# Patient Record
Sex: FEMALE
[De-identification: names, ages, dates, MRNs, and addresses within clinical notes are randomized per-mention and may not be internally consistent; named-entity substitution may affect disease eponyms.]

---

## 2021-07-02 PROBLEM — Z00.00 ENCOUNTER FOR PREVENTIVE HEALTH EXAMINATION: Status: ACTIVE | Noted: 2021-07-02

## 2021-10-01 ENCOUNTER — LABORATORY RESULT (OUTPATIENT)
Age: 51
End: 2021-10-01

## 2021-10-01 ENCOUNTER — APPOINTMENT (OUTPATIENT)
Dept: RHEUMATOLOGY | Facility: CLINIC | Age: 51
End: 2021-10-01
Payer: MEDICARE

## 2021-10-01 VITALS
HEART RATE: 72 BPM | TEMPERATURE: 97.2 F | DIASTOLIC BLOOD PRESSURE: 92 MMHG | WEIGHT: 207 LBS | OXYGEN SATURATION: 100 % | SYSTOLIC BLOOD PRESSURE: 170 MMHG | BODY MASS INDEX: 33.27 KG/M2 | HEIGHT: 66 IN

## 2021-10-01 PROCEDURE — 99204 OFFICE O/P NEW MOD 45 MIN: CPT | Mod: 25

## 2021-10-01 PROCEDURE — 36415 COLL VENOUS BLD VENIPUNCTURE: CPT

## 2021-10-01 NOTE — HISTORY OF PRESENT ILLNESS
[FreeTextEntry1] : 50 y/o female referred to rheumatology for head pressure, pain throughout her body.\par Pt reports pains in the b/l legs, back, fingers, recurrent pressure in different parts of the head x 6-7 months.\par Reports swelling of b/l ankles, tightness of b/l legs.Reports generalized muscle weakness, muscle aches.\par Has smoker's cough, fatigue occasionally, dry eyes uses eye drops.\par Had recent CXR reportedly showed COPD.\par Per patient, MRI of Head was negative.\par Workup with high CPK, statin not held due to Hx of CAD and stents.\par Miscarriages x 2, few months in (has 6 children)\par \par Patient denies fatigue, fever, chills, nasopharyngeal ulcers, chest pain, abdominal pain, SOB, nausea, vomiting, diarrhea, blood in stool, hematuria, rash, photosensitivity, Raynaud's, alopecia, dry mouth, headaches, dysphagia, Hx of DVT/PEs\par \par ROS negative unless otherwise noted above.\par \par Workup 5/2021: , Hgb A1C 8.5\par Normal/neg CBC, CMP, TSH, thyroid panel\par \par

## 2021-10-01 NOTE — ASSESSMENT
[FreeTextEntry1] : 52 y/o female w/ diabetes referred to rheumatology for elevated CPK in setting of generalized joint/muscle aches and head pressure x 6-7 months. Joint pains non-inflammatory by history. Fatigue and dry eyes but no other systemic symptoms. Per patient, MRI of Head was negative. Workup with high CPK 600s (1/2021 and 5/2021), statin not held due to Hx of CAD and stents.\par \par Causes of elevated CPK include autoimmune myositis, endocrine (diabetic myonecrosis, thyroid dysfunction), infectious, medication induced (e.g. statins).  patients can also have elevated baseline level of CPK, which is also possible in this patient given normal LFTs.\par Patient does not have objective muscle weakness on exam and no rashes pathognomonic for dermatomyositis. Low suspicion for inflammatory myopathy or vasculitis based on symptoms and exam.\par \par - Will obtain workup to evaluate for myositis other causes of myopathy. Will consider further testing such as SSA, SSb, Sm, RNP, ANCAs, Hep panel, cryo based on results from above studied.\par - No focal area of weakness or pain to obtain EMG/NCP or MRI or biopsy for evaluation.\par - Will defer to PCP on the risk vs. benefit of continuing on Statin in setting of high CPK and pt's cardiac condition. If workup is unremarkable, CPK is likely not related to muscle damage and Statin can be continued.\par - RTC in 1 month for follow up of above.\par

## 2021-10-02 LAB
25(OH)D3 SERPL-MCNC: 14.1 NG/ML
ALBUMIN SERPL ELPH-MCNC: 4.6 G/DL
ALP BLD-CCNC: 136 U/L
ALT SERPL-CCNC: 20 U/L
ANION GAP SERPL CALC-SCNC: 12 MMOL/L
APPEARANCE: CLEAR
AST SERPL-CCNC: 18 U/L
BASOPHILS # BLD AUTO: 0.05 K/UL
BASOPHILS NFR BLD AUTO: 0.7 %
BILIRUB SERPL-MCNC: 0.3 MG/DL
BILIRUBIN URINE: NEGATIVE
BLOOD URINE: NEGATIVE
BUN SERPL-MCNC: 10 MG/DL
CALCIUM SERPL-MCNC: 9.6 MG/DL
CHLORIDE SERPL-SCNC: 98 MMOL/L
CK SERPL-CCNC: 468 U/L
CO2 SERPL-SCNC: 28 MMOL/L
COLOR: YELLOW
CREAT SERPL-MCNC: 0.72 MG/DL
CREAT SPEC-SCNC: 77 MG/DL
CREAT/PROT UR: 0.6 RATIO
CRP SERPL-MCNC: <3 MG/L
EOSINOPHIL # BLD AUTO: 0.08 K/UL
EOSINOPHIL NFR BLD AUTO: 1.1 %
ERYTHROCYTE [SEDIMENTATION RATE] IN BLOOD BY WESTERGREN METHOD: 6 MM/HR
GLUCOSE QUALITATIVE U: ABNORMAL
GLUCOSE SERPL-MCNC: 352 MG/DL
HCT VFR BLD CALC: 39.9 %
HGB BLD-MCNC: 13 G/DL
IMM GRANULOCYTES NFR BLD AUTO: 0.4 %
KETONES URINE: NEGATIVE
LEUKOCYTE ESTERASE URINE: NEGATIVE
LYMPHOCYTES # BLD AUTO: 2.62 K/UL
LYMPHOCYTES NFR BLD AUTO: 37.1 %
MAN DIFF?: NORMAL
MCHC RBC-ENTMCNC: 31 PG
MCHC RBC-ENTMCNC: 32.6 GM/DL
MCV RBC AUTO: 95.2 FL
MONOCYTES # BLD AUTO: 0.34 K/UL
MONOCYTES NFR BLD AUTO: 4.8 %
NEUTROPHILS # BLD AUTO: 3.95 K/UL
NEUTROPHILS NFR BLD AUTO: 55.9 %
NITRITE URINE: NEGATIVE
PH URINE: 6
PLATELET # BLD AUTO: 219 K/UL
POTASSIUM SERPL-SCNC: 4.7 MMOL/L
PROT SERPL-MCNC: 7.2 G/DL
PROT UR-MCNC: 42 MG/DL
PROTEIN URINE: ABNORMAL
RBC # BLD: 4.19 M/UL
RBC # FLD: 14.6 %
SODIUM SERPL-SCNC: 138 MMOL/L
SPECIFIC GRAVITY URINE: 1.03
TSH SERPL-ACNC: 0.79 UIU/ML
UROBILINOGEN URINE: NORMAL
WBC # FLD AUTO: 7.07 K/UL

## 2021-10-03 LAB — ANA SER IF-ACNC: NEGATIVE

## 2021-10-04 LAB
ALDOLASE SERPL-CCNC: 5 U/L
ENA JO1 AB SER IA-ACNC: <0.2 AL

## 2021-10-05 LAB — ACE BLD-CCNC: 5 U/L

## 2021-10-10 LAB — HMGCR ANTIBODY, IGG: <3 UNITS

## 2021-10-19 LAB
EJ AB SER QL: NEGATIVE
ENA JO1 AB SER IA-ACNC: <20 UNITS
ENA PM/SCL AB SER-ACNC: <20 UNITS
ENA SM+RNP AB SER IA-ACNC: <20 UNITS
ENA SS-A IGG SER QL: <20 UNITS
FIBRILLARIN AB SER QL: NEGATIVE
KU AB SER QL: NEGATIVE
MDA-5 (P140)(CADM-140): <20 UNITS
MI2 AB SER QL: NEGATIVE
NXP-2 (P140): <20 UNITS
OJ AB SER QL: NEGATIVE
PL12 AB SER QL: NEGATIVE
PL7 AB SER QL: NEGATIVE
SRP AB SERPL QL: NEGATIVE
TIF GAMMA (P155/140): <20 UNITS
U2 SNRNP AB SER QL: NEGATIVE

## 2021-12-09 ENCOUNTER — APPOINTMENT (OUTPATIENT)
Dept: RHEUMATOLOGY | Facility: CLINIC | Age: 51
End: 2021-12-09
Payer: MEDICARE

## 2021-12-09 VITALS
SYSTOLIC BLOOD PRESSURE: 182 MMHG | BODY MASS INDEX: 33.43 KG/M2 | RESPIRATION RATE: 18 BRPM | OXYGEN SATURATION: 97 % | HEART RATE: 86 BPM | HEIGHT: 66 IN | DIASTOLIC BLOOD PRESSURE: 98 MMHG | WEIGHT: 208 LBS

## 2021-12-09 DIAGNOSIS — R53.82 CHRONIC FATIGUE, UNSPECIFIED: ICD-10-CM

## 2021-12-09 DIAGNOSIS — R79.89 OTHER SPECIFIED ABNORMAL FINDINGS OF BLOOD CHEMISTRY: ICD-10-CM

## 2021-12-09 DIAGNOSIS — M25.50 PAIN IN UNSPECIFIED JOINT: ICD-10-CM

## 2021-12-09 DIAGNOSIS — M79.10 MYALGIA, UNSPECIFIED SITE: ICD-10-CM

## 2021-12-09 DIAGNOSIS — R74.8 ABNORMAL LEVELS OF OTHER SERUM ENZYMES: ICD-10-CM

## 2021-12-09 PROCEDURE — 99214 OFFICE O/P EST MOD 30 MIN: CPT

## 2021-12-09 RX ORDER — ERGOCALCIFEROL 1.25 MG/1
1.25 MG CAPSULE, LIQUID FILLED ORAL
Qty: 8 | Refills: 0 | Status: ACTIVE | COMMUNITY
Start: 2021-12-09 | End: 1900-01-01

## 2021-12-09 NOTE — ASSESSMENT
[FreeTextEntry1] : 52 y/o female w/ diabetes presents as follow up for elevated CPK in setting of generalized joint/muscle aches and head pressure x 6-7 months. Joint pains non-inflammatory by history. Fatigue and dry eyes but no other systemic symptoms. Per patient, MRI of Head was negative. Workup with high CPK 600s (1/2021 and 5/2021).\par \par Workup with , high glucose, U/A with glucose and protein, UPCR 0.6, Vit D 14.1. Neg/normal aldolase, ESR, CRP, LAVERNE, myositis Abs. Patient had trial off statin for 10 days with worsening of her pains.\par \par Workup without signs of inflammation, markers of myositis. Patient does not have objective muscle weakness on exam and no rashes pathognomonic for dermatomyositis. Given pt's diffuse, atypical symptoms of recurrent sharp muscle pains with negative workup, there is low suspicion for myositis.  Pt's CPK may be not associated with pt's symptoms. May be higher baseline (higher in  patients) given normal aldolase. Statin can cause elevated CPK and pains without blood markers, but trial off prednisone (only 10 days) did not help much.\par \par - Workup negative for underlying myositis.\par - No focal area of weakness or pain to obtain EMG/NCP or MRI or biopsy for evaluation. \par - Will defer to PCP on the risk vs. benefit of continuing on Statin in setting of high CPK and pt's cardiac condition\par - RTC PRN.

## 2021-12-09 NOTE — HISTORY OF PRESENT ILLNESS
[FreeTextEntry1] : HISTORY: \par 52 y/o female w/ diabetes presents as follow up for elevated CPK in setting of generalized joint/muscle aches and head pressure x 6-7 months. Joint pains non-inflammatory by history. Fatigue and dry eyes but no other systemic symptoms. Per patient, MRI of Head was negative. Workup with high CPK 600s (1/2021 and 5/2021), statin not held due to Hx of CAD and stents. \par \par INTERVAL HISTORY: \par Pateint states that she was trialed off of her statin for 10 days but felt that her pains got even worse off the medication. Pt restarted the medication after 10 days with mild improvement in symptoms. Pt has appt with PCP to discuss. Reports general worsening joint pains.\par Workup with CPK stable, negative inflammatory markers.\par \par WORKUP: \par CPK: 600s (1/2021 and 5/2021) -> 468 (10/2021) \par Remarkable for (10/2021): U/A with glucose and protein, UPCR 0.6, ACE level (low), Vit D 25-OH 14.1 \par Normal/neg (10/2021): CMP except glucose 356, CBC, ESR, CRP, TSH, aldolase, LAVERNE, Sena-1, HMGCR Ab, Myomarker Panel 3

## 2021-12-21 ENCOUNTER — NON-APPOINTMENT (OUTPATIENT)
Age: 51
End: 2021-12-21

## 2021-12-21 ENCOUNTER — APPOINTMENT (OUTPATIENT)
Dept: PULMONOLOGY | Facility: CLINIC | Age: 51
End: 2021-12-21
Payer: MEDICARE

## 2021-12-21 VITALS
TEMPERATURE: 97 F | OXYGEN SATURATION: 97 % | DIASTOLIC BLOOD PRESSURE: 92 MMHG | BODY MASS INDEX: 33.27 KG/M2 | WEIGHT: 207 LBS | SYSTOLIC BLOOD PRESSURE: 149 MMHG | HEART RATE: 78 BPM | HEIGHT: 66 IN

## 2021-12-21 PROCEDURE — 99407 BEHAV CHNG SMOKING > 10 MIN: CPT

## 2021-12-21 PROCEDURE — 99204 OFFICE O/P NEW MOD 45 MIN: CPT | Mod: 25

## 2021-12-21 NOTE — ASSESSMENT
[FreeTextEntry1] : Very pleasant 50 yo Black lady referred by Dr Lennon for evaluation of abnormal CXR and hixtory of smoking\par Smoker of 1 ppd for over thirty years, previous history of bronchitis and pneumonia, not hospitalized\par Hx CAD, stenting and s/p insertion of IVCD\par Taken care of by [facility] for years, now by local cardiology--she doesn’t know name\par Had natural covid, then vax x2 and boosted\par Diabetes on Metformin/Trulicity/Lantus\par On Carvedilol asa ramipril, lipitor, trazodone, seroquel\par Recently evaluated for elev CPK with uncertain etiology\par \par Had [facility] chest [facility]\par \par Lungs were fairly well preserved with small bullous changes at apices\par Daily cough , minimally productive\par   \par Mother of 6 and 15 grandchildren, and she participates in care\par \par Not a good historian at all\par \par Imp \par 50 yo lady with longterm smoking history\par Signficant CAD and s/p IVCD for ? indication\par Needs close longterm cardiac f/u\par Probably has some degree of COPD but doesn’t require intervention based upon her symptoms\par The most important intervention in the patient is smoking cessation, especially in view of her extensive cardiac history\par .\par  We had an extensive discussion regarding the multiple potential complications of chronic smoking including pulmonary disease, shortness of breath, emphysema, requirement for oxygen,  pulmonary neoplasm, cardiovascular disease, and cerebrovascular disease. We discussed potential strategies for decreasing smoking and smoking cessation for at least 12 minutes during the patient's visit.\par \par I have asked the patient to return her in six months to reassess her efforts at smoking cessation\par She does not appear ready for significant change at this time, however\par \par Addition of other medications to assist in smoking cessation does not appear warranted or safe at this juncture\par

## 2021-12-21 NOTE — HISTORY OF PRESENT ILLNESS
[TextBox_4] : Very pleasant 52 yo Black lady referred by Dr Lennon for evaluation of abnormal CXR and hixtory of smoking\par Smoker of 1 ppd for over thirty years, previous history of bronchitis and pneumonia, not hospitalized\par Hx CAD, stenting and s/p insertion of IVCD\par Taken care of by [facility] for years, now by local cardiology--she doesn’t know name\par Had natural covid, then vax x2 and boosted\par Diabetes on Metformin/Trulicity/Lantus\par On Carvedilol asa ramipril, lipitor, trazodone, seroquel\par Recently evaluated for elev CPK with uncertain etiology\par \par Had [facility] chest at [facility]\par \par Lungs were fairly well preserved with small bullous changes at apices\par Daily cough , minimally productive\par   \par Mother of 6 and 15 grandchildren, and she participates in care\par \par Not a good historian at all

## 2021-12-21 NOTE — CONSULT LETTER
[Dear  ___] : Dear  [unfilled], [FreeTextEntry1] : I had the pleasure of evaluating your patient, ANGELA IGNACIO , in the office today.  Please review my consultation and evaluation report that follows below.  Please do not hesitate to call me if further information is necessary or if you wish to discuss ongoing care or diagnostic work-up.   \par I very much appreciate your referral and it is a privilege to be able to provide care for your patient.\par \par Sincerely,\par  \par Johann Mahoney MD, MHCM, FACP, JOCELYNN-C\par Pulmonary Medicine\par  of Medicine\par [employer] [facility] [employer] at [facility]\par [email or web address] \par [facility] in [place]\par [address]\[place]  [address]\par    Fax \par \par [facility] [place]\par [address]\par [place], [place] \par \par

## 2022-02-02 ENCOUNTER — RX RENEWAL (OUTPATIENT)
Age: 52
End: 2022-02-02

## 2022-06-17 ENCOUNTER — APPOINTMENT (OUTPATIENT)
Dept: RHEUMATOLOGY | Facility: CLINIC | Age: 52
End: 2022-06-17

## 2022-08-30 ENCOUNTER — APPOINTMENT (OUTPATIENT)
Dept: PULMONOLOGY | Facility: CLINIC | Age: 52
End: 2022-08-30

## 2022-08-30 VITALS
SYSTOLIC BLOOD PRESSURE: 143 MMHG | DIASTOLIC BLOOD PRESSURE: 79 MMHG | BODY MASS INDEX: 33.27 KG/M2 | TEMPERATURE: 97.2 F | HEIGHT: 66 IN | WEIGHT: 207 LBS | HEART RATE: 81 BPM | OXYGEN SATURATION: 97 %

## 2022-08-30 DIAGNOSIS — Z01.818 ENCOUNTER FOR OTHER PREPROCEDURAL EXAMINATION: ICD-10-CM

## 2022-08-30 PROCEDURE — 99214 OFFICE O/P EST MOD 30 MIN: CPT

## 2022-08-30 NOTE — HISTORY OF PRESENT ILLNESS
[TextBox_4] : 53 yo woman, here once in Dec 21 sent for abnormal CT chest\par She is here now for pulmonary clearance prior to routine colonoscopy\par As before, she is a poor historian and does not know the names of her doctors, GI and cardiology\par Still smoking at least 1 ppd at this time\par Using albuterol inhaler only about 2-3 x per week at this time\par Denies wheezing or dyspnea\par CT chest previously showed bullous changes in upper lobes\par \par PMH significant for CAD, s/p stenting, and s/p AICD--caree at [facility] previously, now local cardiology\par Diabetes on Metformin, trulicity, lantus\par Cadiovasc includes carvedilol, asa ramipril, lipitor, trazodone, seroquel\par \par \par \par

## 2022-08-30 NOTE — CONSULT LETTER
[Dear  ___] : Dear  [unfilled], [FreeTextEntry1] : I had the pleasure of evaluating your patient, ANGELA IGNACIO , in the office today.  Please review my consultation and evaluation report that follows below.  Please do not hesitate to call me if further information is necessary or if you wish to discuss ongoing care or diagnostic work-up.   \par I very much appreciate your referral and it is a privilege to be able to provide care for your patient.\par \par Sincerely,\par  \par Johann Mahoney MD, MHCM, FACP, JOCELYNN-C\par Pulmonary Medicine\par  of Medicine\par [employer] [employer] [facility] at [facility]\par [email or web address]\par \par [facility] in [place]\par [address]\[place]  [address]\par    Fax \par \par [facility] [place]\par [address]\par [place], [place] \par \par

## 2022-08-30 NOTE — ASSESSMENT
[FreeTextEntry1] : 53 yo woman, here once in Dec 21 sent for abnormal CT chest\par She is here now for pulmonary clearance prior to routine colonoscopy\par As before, she is a poor historian and does not know the names of her doctors, GI and cardiology\par Still smoking at least 1 ppd at this time\par Using albuterol inhaler only about 2-3 x per week at this time\par Denies wheezing or dyspnea\par CT chest previously showed bullous changes in upper lobes\par \par PMH significant for CAD, s/p stenting, and s/p AICD--caree at [facility] previously, now local cardiology\par Diabetes on Metformin, trulicity, lantus\par Cadiovasc includes carvedilol, asa ramipril, lipitor, trazodone, seroquel\par \par \par Imp 53 yo woman with long smoking history --still smoking one ppd\par Probable history of COPD altho it is mild\par CT chest showed small apical bullous disease only\par Has not had PFTs\par However, her pulmonary status is at baseline at this time and does not appear to pose a risk to planned colonoscopy\par Primary clearance appears to be required from cardiology in view of hx CAD, stenting, and AICD\par Return here for further w/u in 2-3 months but primary focus will be on smoking cessation

## 2023-10-04 ENCOUNTER — NON-APPOINTMENT (OUTPATIENT)
Age: 53
End: 2023-10-04

## 2023-10-04 ENCOUNTER — APPOINTMENT (OUTPATIENT)
Dept: PULMONOLOGY | Facility: CLINIC | Age: 53
End: 2023-10-04
Payer: MEDICARE

## 2023-10-04 VITALS
OXYGEN SATURATION: 98 % | BODY MASS INDEX: 33.43 KG/M2 | HEIGHT: 66 IN | SYSTOLIC BLOOD PRESSURE: 127 MMHG | DIASTOLIC BLOOD PRESSURE: 78 MMHG | WEIGHT: 208 LBS | HEART RATE: 86 BPM

## 2023-10-04 DIAGNOSIS — Z01.811 ENCOUNTER FOR PREPROCEDURAL RESPIRATORY EXAMINATION: ICD-10-CM

## 2023-10-04 DIAGNOSIS — J98.4 OTHER DISORDERS OF LUNG: ICD-10-CM

## 2023-10-04 DIAGNOSIS — F17.200 NICOTINE DEPENDENCE, UNSPECIFIED, UNCOMPLICATED: ICD-10-CM

## 2023-10-04 DIAGNOSIS — J43.9 EMPHYSEMA, UNSPECIFIED: ICD-10-CM

## 2023-10-04 PROCEDURE — 94010 BREATHING CAPACITY TEST: CPT

## 2023-10-04 PROCEDURE — 99214 OFFICE O/P EST MOD 30 MIN: CPT | Mod: 25

## 2023-10-04 PROCEDURE — 99407 BEHAV CHNG SMOKING > 10 MIN: CPT

## 2023-10-04 RX ORDER — ALBUTEROL SULFATE 90 UG/1
108 (90 BASE) INHALANT RESPIRATORY (INHALATION)
Qty: 2 | Refills: 6 | Status: ACTIVE | COMMUNITY
Start: 2023-10-04 | End: 1900-01-01

## 2024-02-05 DIAGNOSIS — G89.29 EPIGASTRIC PAIN: ICD-10-CM

## 2024-02-05 DIAGNOSIS — R10.13 EPIGASTRIC PAIN: ICD-10-CM

## 2024-02-05 DIAGNOSIS — Z12.11 ENCOUNTER FOR SCREENING FOR MALIGNANT NEOPLASM OF COLON: ICD-10-CM

## 2024-02-06 ENCOUNTER — APPOINTMENT (OUTPATIENT)
Dept: PULMONOLOGY | Facility: CLINIC | Age: 54
End: 2024-02-06

## 2024-03-07 RX ORDER — SODIUM SULFATE, POTASSIUM SULFATE AND MAGNESIUM SULFATE 1.6; 3.13; 17.5 G/177ML; G/177ML; G/177ML
17.5-3.13-1.6 SOLUTION ORAL
Qty: 1 | Refills: 0 | Status: ACTIVE | COMMUNITY
Start: 2024-03-07 | End: 1900-01-01

## 2024-08-01 ENCOUNTER — APPOINTMENT (OUTPATIENT)
Dept: GASTROENTEROLOGY | Facility: HOSPITAL | Age: 54
End: 2024-08-01

## 2024-11-13 ENCOUNTER — APPOINTMENT (OUTPATIENT)
Dept: GASTROENTEROLOGY | Facility: CLINIC | Age: 54
End: 2024-11-13